# Patient Record
Sex: FEMALE | Race: WHITE | Employment: PART TIME | URBAN - METROPOLITAN AREA
[De-identification: names, ages, dates, MRNs, and addresses within clinical notes are randomized per-mention and may not be internally consistent; named-entity substitution may affect disease eponyms.]

---

## 2017-01-18 ENCOUNTER — HOSPITAL ENCOUNTER (OUTPATIENT)
Dept: MRI IMAGING | Age: 62
Discharge: HOME | End: 2017-01-18
Attending: SPECIALIST
Payer: COMMERCIAL

## 2017-01-18 VITALS
WEIGHT: 122 LBS | BODY MASS INDEX: 19.61 KG/M2 | OXYGEN SATURATION: 100 % | HEIGHT: 66 IN | SYSTOLIC BLOOD PRESSURE: 125 MMHG | DIASTOLIC BLOOD PRESSURE: 76 MMHG | HEART RATE: 69 BPM | RESPIRATION RATE: 14 BRPM

## 2017-01-18 DIAGNOSIS — I49.3 FREQUENT PVCS: ICD-10-CM

## 2017-01-18 PROCEDURE — 75561 CARDIAC MRI FOR MORPH W/DYE: CPT

## 2017-01-18 PROCEDURE — 99153 MOD SED SAME PHYS/QHP EA: CPT

## 2017-01-18 PROCEDURE — 75557 CARDIAC MRI FOR MORPH: CPT

## 2017-01-18 PROCEDURE — 99152 MOD SED SAME PHYS/QHP 5/>YRS: CPT

## 2017-01-18 PROCEDURE — A9579 GAD-BASE MR CONTRAST NOS,1ML: HCPCS | Performed by: SPECIALIST

## 2017-01-18 PROCEDURE — 74011250636 HC RX REV CODE- 250/636: Performed by: INTERNAL MEDICINE

## 2017-01-18 PROCEDURE — 77030021566

## 2017-01-18 PROCEDURE — 74011636320 HC RX REV CODE- 636/320: Performed by: SPECIALIST

## 2017-01-18 RX ORDER — MIDAZOLAM HYDROCHLORIDE 1 MG/ML
5 INJECTION, SOLUTION INTRAMUSCULAR; INTRAVENOUS
Status: DISCONTINUED | OUTPATIENT
Start: 2017-01-18 | End: 2017-01-18

## 2017-01-18 RX ORDER — FENTANYL CITRATE 50 UG/ML
100 INJECTION, SOLUTION INTRAMUSCULAR; INTRAVENOUS
Status: DISCONTINUED | OUTPATIENT
Start: 2017-01-18 | End: 2017-01-18

## 2017-01-18 RX ADMIN — MIDAZOLAM HYDROCHLORIDE 1 MG: 1 INJECTION, SOLUTION INTRAMUSCULAR; INTRAVENOUS at 09:01

## 2017-01-18 RX ADMIN — GADOPENTETATE DIMEGLUMINE 25 ML: 469.01 INJECTION INTRAVENOUS at 12:00

## 2017-01-18 RX ADMIN — MIDAZOLAM HYDROCHLORIDE 1 MG: 1 INJECTION, SOLUTION INTRAMUSCULAR; INTRAVENOUS at 09:04

## 2017-01-18 RX ADMIN — MIDAZOLAM HYDROCHLORIDE 1 MG: 1 INJECTION, SOLUTION INTRAMUSCULAR; INTRAVENOUS at 09:07

## 2017-01-18 NOTE — DISCHARGE INSTRUCTIONS
Sedation for a Medical Procedure: After Your Visit  Instructions. Sedatives are used to relax you for a procedure. You may or may not be awake during the procedure. Common side effects of sedation medications include:                   Drowsiness, dizziness, euphoria, sleepiness or confusion. I              Unsteady gait. Loss of fine muscle control and delayed reaction time. Visual                       disturbances, difficulty focusing and blurred vision. Impaired memory recall. Follow-up care is a key component for your health and safety. Be sure to make and go to all medical appointments. Call your doctor if you are having problems. It's also a good idea to keep a list of your allergies, medicines with doses and test results on hand    Home care following your sedation procedure: You may experience some of these side effects or you may not be aware of subtle changes in your behavior or reaction time. Because you received these medications, we are giving you the following instructions: Activity   For your safety, you should not drive until the medicine wears off and you can think clearly and react easily. Do not drive for 24 hours. Rest when you feel tired. Getting enough sleep will help you recover. Diet    You can eat your normal diet. If your stomach is upset, try bland, low-fat foods like plain rice, broiled chicken, toast, and yogurt. Drink plenty of fluids unless your doctor advised you to limit your fluids. Do not consume alcoholic beverages for 24 hours. Instructions  Do not make important personal, business, or legal decisions for 24 hours. Move slowly and carefully, do not make sudden position changes. Be alert for dizziness or lightheadedness and move accordingly. Have a responsible person assist you. Do not drive for 24 hours. Do not operate equipment for 24 hours. [employer], power tools, kitchen appliances, etc.)    Discharge medications:  Resume prior to test medications as prescribed by your personal physician. If you have any questions or concerns call Radiology RN at [phone]  After hours call Radiology on-call at [phone]    Call [phone] any time you think you may need emergency care. For example:                Call if you passed out (lost consciousness). The medicine is not wearing off and you cannot think clearly. Watch closely for changes in your health, and be sure to contact your doctor if                  you have any problems. Where can you learn more? Go to [email or web address]   Enter G817 in the search box to learn more about \"Sedation for a Medical Procedure: After Your Visit. \"

## 2017-01-18 NOTE — IP AVS SNAPSHOT
Mariana Portage 
 
 
 [address] 
[phone] Patient: Minor Alt MRN: [id number] KV You are allergic to the following Allergen Reactions Sulfa (Sulfonamide Antibiotics) Rash Recent Documentation Height Weight Breastfeeding? BMI Smoking Status 1.676 m 55.3 kg No 19.69 kg/m2 Never Smoker Emergency Contacts Name Discharge Info Relation Home Work Mobile [facility] DISCHARGE CAREGIVER [3] Spouse [3] [phone] About your hospitalization You were admitted on:  2017 You last received care in the:  [address] You were discharged on:  2017 Unit phone number:  [phone] Why you were hospitalized Your primary diagnosis was:  Not on File Providers Seen During Your Hospitalizations Provider Role Specialty Primary office phone Mackenzie Trujillo MD Attending Provider Cardiology [phone] Your Primary Care Physician (PCP) Primary Care Physician Office Phone Office Fax [facility] [phone] Follow-up Information None Your Appointments 2017 11:40 AM EST New Patient with Sruthi Xie MD  
[facility] ([facility]) [address] [address] [address] [facility]  
[phone]   3:20 PM EST  
ESTABLISHED PATIENT with Mackenzie Trujillo MD  
[facility] ([facility]) [address] [facility] [address] [place] [address]  
[phone] Current Discharge Medication List  
  
ASK your doctor about these medications Dose & Instructions Dispensing Information Comments Morning Noon Evening Bedtime Cholecalciferol (Vitamin D3) 2,000 unit Cap capsule Commonly known as:  VITAMIN D3  
   
 Your next dose is: Today, Tomorrow Other:  _________ Take  by mouth two (2) times a day. Refills:  0  
     
   
   
   
  
 COQ10 (UBIQUINOL) PO Your next dose is: Today, Tomorrow Other:  _________ Take  by mouth. Refills:  0 KRILL OIL PO Your next dose is: Today, Tomorrow Other:  _________ Take  by mouth. Refills:  0 MAGNESIUM CITRATE PO Your next dose is: Today, Tomorrow Other:  _________ Take  by mouth. Refills:  0  
     
   
   
   
  
 metoprolol tartrate 25 mg tablet Commonly known as:  LOPRESSOR Your next dose is: Today, Tomorrow Other:  _________ Dose:  12.5 mg Take 0.5 Tabs by mouth two (2) times a day. Quantity:  30 Tab Refills:  12 Discharge Instructions Sedation for a Medical Procedure: After Your Visit  Instructions. Sedatives are used to relax you for a procedure. You may or may not be awake during the procedure. Common side effects of sedation medications include:    
 
            Drowsiness, dizziness, euphoria, sleepiness or confusion. I 
            Unsteady gait. Loss of fine muscle control and delayed reaction time. Visual                       disturbances, difficulty focusing and blurred vision. Impaired memory recall. Follow-up care is a key component for your health and safety. Be sure to make and go to all medical appointments. Call your doctor if you are having problems. It's also a good idea to keep a list of your allergies, medicines with doses and test results on hand Home care following your sedation procedure: You may experience some of these side effects or you may not be aware of subtle changes in your behavior or reaction time.   Because you received these medications, we are giving you the following instructions: Activity For your safety, you should not drive until the medicine wears off and you can think clearly and react easily. Do not drive for 24 hours. Rest when you feel tired. Getting enough sleep will help you recover. Diet You can eat your normal diet. If your stomach is upset, try bland, low-fat foods like plain rice, broiled chicken, toast, and yogurt. Drink plenty of fluids unless your doctor advised you to limit your fluids. Do not consume alcoholic beverages for 24 hours. Instructions Do not make important personal, business, or legal decisions for 24 hours. Move slowly and carefully, do not make sudden position changes. Be alert for dizziness or lightheadedness and move accordingly. Have a responsible person assist you. Do not drive for 24 hours. Do not operate equipment for 24 hours. [employer] mowers, power tools, kitchen appliances, etc.) Discharge medications: 
Resume prior to test medications as prescribed by your personal physician. If you have any questions or concerns call Radiology RN at [phone] After hours call Radiology on-call at [phone] Call [phone] any time you think you may need emergency care. For example:  
             Call if you passed out (lost consciousness). The medicine is not wearing off and you cannot think clearly. Watch closely for changes in your health, and be sure to contact your doctor if                  you have any problems. Where can you learn more? Go to [employer].be Enter J749 in the search box to learn more about \"Sedation for a Medical Procedure: After Your Visit. \" Discharge Orders None Introducing [facility]! Dear Osiris Ramon: 
Thank you for requesting a [employer] account.   Our records indicate that you already have an active [employer] account. You can access your account anytime at [email or web address] Did you know that you can access your hospital and ER discharge instructions at any time in [employer]? You can also review all of your test results from your hospital stay or ER visit. Additional Information If you have questions, please visit the Frequently Asked Questions section of the [employer] website at [email or web address] Remember, [employer] is NOT to be used for urgent needs. For medical emergencies, dial 911. Now available from your iPhone and Android! General Information Please provide this summary of care documentation to your next provider. Patient Signature:  ____________________________________________________________ Date:  ____________________________________________________________  
  
[facility] Provider Signature:  ____________________________________________________________ Date:  ____________________________________________________________

## 2017-01-18 NOTE — PROGRESS NOTES
Pt received to [facility] ambulatory, changed to gown and assessed. LS clear, auscultated S1, S2 HS. 20g IV started in R forearm. VS taken, stable. Consent signed by patient as well as disc release form. Allergies reviewed, NPO verified. Airway assessed, Mallampati 2 with 3 fingers. Dr Kevon Camarena in to consent patient. 0845--Taken to MRI, and prepped on table. Timeout at 0900, sedation started at 0901. Pt tolerated well, total of 3 mg versed given. Pt returned to [facility] alert and in stable condition. Discharge directions given. Pt taken out to waiting area with .

## 2017-01-27 ENCOUNTER — OFFICE VISIT (OUTPATIENT)
Dept: CARDIOLOGY CLINIC | Age: 62
End: 2017-01-27

## 2017-01-27 VITALS
HEIGHT: 66 IN | SYSTOLIC BLOOD PRESSURE: 134 MMHG | HEART RATE: 64 BPM | WEIGHT: 127.6 LBS | BODY MASS INDEX: 20.51 KG/M2 | DIASTOLIC BLOOD PRESSURE: 80 MMHG | OXYGEN SATURATION: 98 % | RESPIRATION RATE: 16 BRPM

## 2017-01-27 DIAGNOSIS — I49.3 PVC'S (PREMATURE VENTRICULAR CONTRACTIONS): Primary | ICD-10-CM

## 2017-01-27 RX ORDER — LANOLIN ALCOHOL/MO/W.PET/CERES
400 CREAM (GRAM) TOPICAL DAILY
COMMUNITY

## 2017-01-27 NOTE — PROGRESS NOTES
Visit Vitals    /80 (BP 1 Location: Left arm, BP Patient Position: Sitting)    Pulse 64    Resp 16    Ht 5' 6\" (1.676 m)    Wt 127 lb 9.6 oz (57.9 kg)    SpO2 98%    BMI 20.6 kg/m2

## 2017-01-27 NOTE — PROGRESS NOTES
HISTORY OF PRESENTING ILLNESS      Armando Nielsen is a 57 y.o. female with asymptomatic PVCs referred for evaluation of cardiac risk. She reports family history of pre-mature sudden death. Her father passed away in her 62s and her brother in his late 46s. She notes few episodes of syncope due to high stress, otherwise she denies any recurrent syncope or near syncopal symptoms. Previous holter demonstrated 15% burden. The patient denies chest pain/ shortness of breath, orthopnea, PND, LE edema, palpitations, syncope, presyncope or fatigue. ACTIVE PROBLEM LIST     Patient Active Problem List    Diagnosis Date Noted    Frequent PVCs 12/08/2016           PAST MEDICAL HISTORY     Past Medical History   Diagnosis Date    FH ischemic heart disease      MI's in brother and father (they had sudden death)    Frequent PVCs      Asymptomatic PVC's noted on EKG 11/11/16    Scoliosis            PAST SURGICAL HISTORY     No past surgical history on file. ALLERGIES     Allergies   Allergen Reactions    Sulfa (Sulfonamide Antibiotics) Rash          FAMILY HISTORY     Family History   Problem Relation Age of Onset    Heart Attack Father     Heart Attack Brother     negative for cardiac disease       SOCIAL HISTORY     Social History     Social History    Marital status:      Spouse name: N/A    Number of children: N/A    Years of education: N/A     Social History Main Topics    Smoking status: Never Smoker    Smokeless tobacco: Not on file    Alcohol use No    Drug use: Not on file    Sexual activity: Not on file     Other Topics Concern    Not on file     Social History Narrative         MEDICATIONS     Current Outpatient Prescriptions   Medication Sig    metoprolol tartrate (LOPRESSOR) 25 mg tablet Take 0.5 Tabs by mouth two (2) times a day.  Cholecalciferol, Vitamin D3, (VITAMIN D3) 2,000 unit cap capsule Take  by mouth two (2) times a day.  COQ10, UBIQUINOL, PO Take  by mouth.     MAGNESIUM CITRATE PO Take  by mouth.  KRILL OIL PO Take  by mouth. No current facility-administered medications for this visit. I have reviewed the nurses notes, vitals, problem list, allergy list, medical history, family, social history and medications. REVIEW OF SYMPTOMS      General: Pt denies excessive weight gain or loss. Pt is able to conduct ADL's  HEENT: Denies blurred vision, headaches, hearing loss, epistaxis and difficulty swallowing. Respiratory: Denies cough, congestion, shortness of breath, RAMAN, wheezing or stridor. Cardiovascular: Denies precordial pain, edema or PND, Positive for skipped beats. Gastrointestinal: Denies poor appetite, indigestion, abdominal pain or blood in stool  Genitourinary: Denies hematuria, dysuria, increased urinary frequency  Musculoskeletal: Denies joint pain or swelling from muscles or joints  Neurologic: Denies tremor, paresthesias, headache, or sensory motor disturbance  Psychiatric: Denies confusion, insomnia, depression  Integumentray: Denies rash, itching or ulcers. Hematologic: Denies easy bruising, bleeding     PHYSICAL EXAMINATION      There were no vitals filed for this visit. General: Well developed, in no acute distress. HEENT: No jaundice, oral mucosa moist, no oral ulcers  Neck: Supple, no stiffness, no lymphadenopathy, supple  Heart:  Normal S1/S2 negative S3 or S4. Regular, no murmur, gallop or rub, no jugular venous distention  Respiratory: Clear bilaterally x 4, no wheezing or rales  Abdomen:   Soft, non-tender, bowel sounds are active.   Extremities:  No edema, normal cap refill, no cyanosis. Musculoskeletal: No clubbing, no deformities  Neuro: A&Ox3, speech clear, gait stable, cooperative, no focal neurologic deficits  Skin: Skin color is normal. No rashes or lesions.  Non diaphoretic, moist.  Vascular: 2+ pulses symmetric in all extremities       DIAGNOSTIC DATA      EKG: sinus with PVC's with right bundle branch, superior axis with positive precordial transition between V3 and V4     LABORATORY DATA    No results found for: WBC, HGBPOC, HGB, HGBP, HCTPOC, HCT, PHCT, RBCH, PLT, MCV, HGBEXT, HCTEXT, PLTEXT   No results found for: NA, K, CL, CO2, AGAP, GLU, BUN, CREA, BUCR, GFRAA, GFRNA, CA, TBIL, TBILI, GPT, SGOT, AP, TP, ALB, GLOB, AGRAT, ALT        ASSESSMENT      1. PVCs       PLAN      Previous holter demonstrated 15% burden. Will obtain a holter monitor in 2-3 weeks for further evaluation of PVC burden since initiation of Metoprolol. May consider Diltiazem in the future as she has reported some fatigue with the initiation of Metoprolol. FOLLOW-UP     Follow up in 2-3 weeks. Thank you,  Regina Hernandez MD and Dr. Hilda Rainey for involving me in the care of this extraordinarily pleasant female. Please do not hesitate to contact me for further questions/concerns. Written by yogesh Roy, as dictated by Dr. Lexus Casiano.      Lexus Casiano MD  Cardiac Electrophysiology / Cardiology    [address] [address].  [address], [address] [facility], [address]  [place], [address] [place] [address], [facility]  [phone] / [phone] Fax   [phone] / [phone] Fax

## 2017-02-16 ENCOUNTER — CLINICAL SUPPORT (OUTPATIENT)
Dept: CARDIOLOGY CLINIC | Age: 62
End: 2017-02-16

## 2017-02-16 DIAGNOSIS — I49.3 PVC (PREMATURE VENTRICULAR CONTRACTION): Primary | ICD-10-CM

## 2017-02-16 NOTE — PROGRESS NOTES
Applied holter monitor for pt per Dr Coleman nGame  Dx: PVC. Pt has #[id number] & is due back on Fri 2/17/17.

## 2017-03-03 ENCOUNTER — OFFICE VISIT (OUTPATIENT)
Dept: CARDIOLOGY CLINIC | Age: 62
End: 2017-03-03

## 2017-03-03 VITALS
HEART RATE: 64 BPM | RESPIRATION RATE: 16 BRPM | OXYGEN SATURATION: 100 % | BODY MASS INDEX: 20.34 KG/M2 | DIASTOLIC BLOOD PRESSURE: 84 MMHG | WEIGHT: 126.6 LBS | SYSTOLIC BLOOD PRESSURE: 122 MMHG | HEIGHT: 66 IN

## 2017-03-03 DIAGNOSIS — I49.3 PVC (PREMATURE VENTRICULAR CONTRACTION): Primary | ICD-10-CM

## 2017-03-03 RX ORDER — DILTIAZEM HYDROCHLORIDE 120 MG/1
120 CAPSULE, COATED, EXTENDED RELEASE ORAL DAILY
Qty: 30 CAP | Refills: 3 | Status: SHIPPED | OUTPATIENT
Start: 2017-03-03 | End: 2017-04-26

## 2017-03-03 NOTE — PROGRESS NOTES
Visit Vitals    /84 (BP 1 Location: Left arm, BP Patient Position: Sitting)    Pulse 64    Resp 16    Ht 5' 6\" (1.676 m)    Wt 126 lb 9.6 oz (57.4 kg)    SpO2 100%    BMI 20.43 kg/m2

## 2017-03-03 NOTE — PROGRESS NOTES
HISTORY OF PRESENTING ILLNESS      Laura Kohli is a 57 y.o. female with with asymptomatic PVCs referred for evaluation of cardiac risk. She reports family history of pre-mature sudden death. Her father passed away in her 62s and her brother in his late 46s. She notes few episodes of syncope due to high stress, otherwise she denies any recurrent syncope or near syncopal symptoms. Previous holter demonstrated 15% burden. Last visit we obtained an additional holter monitor to evaluate PVC burden since initiation of Metoprolol. Her PVC burden has decreased to .23% with some rare atrial ectopy. SHe still feels \"weird\" and fatigued with the metoprolol. This is her high stress season as an  and her brother was recently diagnosed with lung cancer. The patient denies chest pain/ shortness of breath, orthopnea, PND, LE edema, palpitations, syncope, presyncope or fatigue. ACTIVE PROBLEM LIST     Patient Active Problem List    Diagnosis Date Noted    Frequent PVCs 12/08/2016           PAST MEDICAL HISTORY     Past Medical History:   Diagnosis Date    FH ischemic heart disease     MI's in brother and father (they had sudden death)    Frequent PVCs     Asymptomatic PVC's noted on EKG 11/11/16    Scoliosis            PAST SURGICAL HISTORY     No past surgical history on file.        ALLERGIES     Allergies   Allergen Reactions    Sulfa (Sulfonamide Antibiotics) Rash          FAMILY HISTORY     Family History   Problem Relation Age of Onset    Heart Attack Father     Heart Attack Brother     negative for cardiac disease       SOCIAL HISTORY     Social History     Social History    Marital status:      Spouse name: N/A    Number of children: N/A    Years of education: N/A     Social History Main Topics    Smoking status: Never Smoker    Smokeless tobacco: Not on file    Alcohol use No    Drug use: Not on file    Sexual activity: Not on file     Other Topics Concern    Not on file Social History Narrative         MEDICATIONS     Current Outpatient Prescriptions   Medication Sig    magnesium oxide (MAG-OX) 400 mg tablet Take 400 mg by mouth daily.  metoprolol tartrate (LOPRESSOR) 25 mg tablet Take 0.5 Tabs by mouth two (2) times a day. (Patient taking differently: Take 6.25 mg by mouth two (2) times a day.)    Cholecalciferol, Vitamin D3, (VITAMIN D3) 2,000 unit cap capsule Take 2,000 Units by mouth daily.  COQ10, UBIQUINOL, PO Take  by mouth.  KRILL OIL PO Take  by mouth daily. No current facility-administered medications for this visit. I have reviewed the nurses notes, vitals, problem list, allergy list, medical history, family, social history and medications. REVIEW OF SYMPTOMS      General: Pt denies excessive weight gain or loss. Pt is able to conduct ADL's  HEENT: Denies blurred vision, headaches, hearing loss, epistaxis and difficulty swallowing. Respiratory: Denies cough, congestion, shortness of breath, RAMAN, wheezing or stridor. Cardiovascular: Denies precordial pain, palpitations, edema or PND  Gastrointestinal: Denies poor appetite, indigestion, abdominal pain or blood in stool  Genitourinary: Denies hematuria, dysuria, increased urinary frequency  Musculoskeletal: Denies joint pain or swelling from muscles or joints  Neurologic: Denies tremor, paresthesias, headache, or sensory motor disturbance  Psychiatric: Denies confusion, insomnia, depression  Integumentray: Denies rash, itching or ulcers. Hematologic: Denies easy bruising, bleeding       PHYSICAL EXAMINATION      There were no vitals filed for this visit. General: Well developed, in no acute distress. HEENT: No jaundice, oral mucosa moist, no oral ulcers  Neck: Supple, no stiffness, no lymphadenopathy, supple  Heart:  Normal S1/S2 negative S3 or S4.  Regular, no murmur, gallop or rub, no jugular venous distention  Respiratory: Clear bilaterally x 4, no wheezing or rales  Abdomen:   Soft, non-tender, bowel sounds are active.   Extremities:  No edema, normal cap refill, no cyanosis. Musculoskeletal: No clubbing, no deformities  Neuro: A&Ox3, speech clear, gait stable, cooperative, no focal neurologic deficits  Skin: Skin color is normal. No rashes or lesions. Non diaphoretic, moist.  Vascular: 2+ pulses symmetric in all extremities       DIAGNOSTIC DATA      EKG:        LABORATORY DATA    No results found for: WBC, HGBPOC, HGB, HGBP, HCTPOC, HCT, PHCT, RBCH, PLT, MCV, HGBEXT, HCTEXT, PLTEXT   No results found for: NA, K, CL, CO2, AGAP, GLU, BUN, CREA, BUCR, GFRAA, GFRNA, CA, TBIL, TBILI, GPT, SGOT, AP, TP, ALB, GLOB, AGRAT, ALT        ASSESSMENT      1. PVCs     PLAN     She has had adequate decrease of PVC burden (from 15% to .23% of all beats) with initiation of Metoprolol; however, she continued to have fatigue and has been taking 6.25mg BID. Will stop Metoprolol and start Diltiazem 120mg daily and follow up in 6 weeks with EKG and to assess improvement of fatigue. FOLLOW-UP     Follow up 6 weeks. Thank you, Ayan Yoo MD and Dr. Gary Cole for allowing me to participate in the care of this extraordinarily pleasant female. Please do not hesitate to contact me for further questions/concerns.      AMANDA Menon MD  Cardiac Electrophysiology / Cardiology    [facility].  [address] [place] [address], [facility], [address], [facility]  [phone] / [phone] Fax   [phone] / [phone] Fax

## 2017-03-03 NOTE — PATIENT INSTRUCTIONS
· Start diltiazem 120mg daily. · Follow up with our office in 6 weeks. · Contact our office with any concerns.

## 2017-04-26 ENCOUNTER — OFFICE VISIT (OUTPATIENT)
Dept: CARDIOLOGY CLINIC | Age: 62
End: 2017-04-26

## 2017-04-26 VITALS
OXYGEN SATURATION: 99 % | SYSTOLIC BLOOD PRESSURE: 114 MMHG | RESPIRATION RATE: 16 BRPM | HEIGHT: 66 IN | BODY MASS INDEX: 20.12 KG/M2 | HEART RATE: 68 BPM | DIASTOLIC BLOOD PRESSURE: 70 MMHG | WEIGHT: 125.2 LBS

## 2017-04-26 DIAGNOSIS — I49.3 FREQUENT PVCS: Primary | ICD-10-CM

## 2017-04-26 RX ORDER — METOPROLOL TARTRATE 25 MG/1
6.25 TABLET, FILM COATED ORAL 2 TIMES DAILY
COMMUNITY
Start: 2017-03-26

## 2017-04-26 NOTE — PROGRESS NOTES
HISTORY OF PRESENTING ILLNESS      Yenifer Wright is a 57 y.o. female with asymptomatic PVCs referred for evaluation of cardiac risk. She reports family history of pre-mature sudden death. Her father passed away in her 62s and her brother in his late 46s. She notes few episodes of syncope due to high stress, otherwise she denies any recurrent syncope or near syncopal symptoms. Previous holter demonstrated 15% burden. Last visit we obtained an additional holter monitor to evaluate PVC burden since initiation of Metoprolol. Her PVC burden has decreased to .23% with some rare atrial ectopy. SHe still feels \"weird\" and fatigued with the metoprolol. This is her high stress season as an  and her brother was recently diagnosed with lung cancer. Last visit she reported fatigue with Metoprolol. It was discontinued and Diltiazem was initiated, however she opted to continue Metoprolol at 6.25mg BID and she presents now for follow up. She denies further fatigue with metoprolol. The patient denies chest pain/ shortness of breath, orthopnea, PND, LE edema, palpitations, syncope, presyncope or fatigue. ACTIVE PROBLEM LIST     Patient Active Problem List    Diagnosis Date Noted    Frequent PVCs 12/08/2016           PAST MEDICAL HISTORY     Past Medical History:   Diagnosis Date    FH ischemic heart disease     MI's in brother and father (they had sudden death)    Frequent PVCs     Asymptomatic PVC's noted on EKG 11/11/16    Scoliosis            PAST SURGICAL HISTORY     No past surgical history on file.        ALLERGIES     Allergies   Allergen Reactions    Sulfa (Sulfonamide Antibiotics) Rash          FAMILY HISTORY     Family History   Problem Relation Age of Onset    Heart Attack Father     Heart Attack Brother     negative for cardiac disease       SOCIAL HISTORY     Social History     Social History    Marital status:      Spouse name: N/A    Number of children: N/A    Years of education: N/A     Social History Main Topics    Smoking status: Never Smoker    Smokeless tobacco: Not on file    Alcohol use No    Drug use: Not on file    Sexual activity: Not on file     Other Topics Concern    Not on file     Social History Narrative         MEDICATIONS     Current Outpatient Prescriptions   Medication Sig    dilTIAZem CD (CARDIZEM CD) 120 mg ER capsule Take 1 Cap by mouth daily.  magnesium oxide (MAG-OX) 400 mg tablet Take 400 mg by mouth daily.  Cholecalciferol, Vitamin D3, (VITAMIN D3) 2,000 unit cap capsule Take 2,000 Units by mouth daily.  COQ10, UBIQUINOL, PO Take  by mouth.  KRILL OIL PO Take  by mouth daily. No current facility-administered medications for this visit. I have reviewed the nurses notes, vitals, problem list, allergy list, medical history, family, social history and medications. REVIEW OF SYMPTOMS      General: Pt denies excessive weight gain or loss. Pt is able to conduct ADL's  HEENT: Denies blurred vision, headaches, hearing loss, epistaxis and difficulty swallowing. Respiratory: Denies cough, congestion, shortness of breath, RAMAN, wheezing or stridor. Cardiovascular: +palpitations, Denies precordial pain,  edema or PND  Gastrointestinal: Denies poor appetite, indigestion, abdominal pain or blood in stool  Genitourinary: Denies hematuria, dysuria, increased urinary frequency  Musculoskeletal: Denies joint pain or swelling from muscles or joints  Neurologic: Denies tremor, paresthesias, headache, or sensory motor disturbance  Psychiatric: Denies confusion, insomnia, depression  Integumentray: Denies rash, itching or ulcers. Hematologic: Denies easy bruising, bleeding       PHYSICAL EXAMINATION      There were no vitals filed for this visit. General: Well developed, in no acute distress.   HEENT: No jaundice, oral mucosa moist, no oral ulcers  Neck: Supple, no stiffness, no lymphadenopathy, supple  Heart:  Normal S1/S2 negative S3 or S4. Regular, no murmur, gallop or rub, no jugular venous distention  Respiratory: Clear bilaterally x 4, no wheezing or rales  Abdomen:   Soft, non-tender, bowel sounds are active.   Extremities:  No edema, normal cap refill, no cyanosis. Musculoskeletal: No clubbing, no deformities  Neuro: A&Ox3, speech clear, gait stable, cooperative, no focal neurologic deficits  Skin: Skin color is normal. No rashes or lesions. Non diaphoretic, moist.  Vascular: 2+ pulses symmetric in all extremities       DIAGNOSTIC DATA      EKG: sinus rhythm        LABORATORY DATA    No results found for: WBC, HGBPOC, HGB, HGBP, HCTPOC, HCT, PHCT, RBCH, PLT, MCV, HGBEXT, HCTEXT, PLTEXT   No results found for: NA, K, CL, CO2, AGAP, GLU, BUN, CREA, BUCR, GFRAA, GFRNA, CA, TBIL, TBILI, GPT, SGOT, AP, TP, ALB, GLOB, AGRAT, ALT        ASSESSMENT      1. Premature ventricular contractions       PLAN     She will continue with current dose of metoprolol as she is pleased with current level of control of palpitations. Will consider escalation of drug therapy for increase in symptoms. FOLLOW-UP   6 months. Thank you, Samm Singh MD  And Dr. Rosa York for allowing me to participate in the care of this extraordinarily pleasant female. Please do not hesitate to contact me for further questions/concerns.      AMANDA Sheth MD  Cardiac Electrophysiology / Cardiology    [facility] [address], [address], [address]  [facility]    [address] [facility]  [phone] / [phone] Fax   [phone] / [phone] Fax

## 2017-04-26 NOTE — PROGRESS NOTES
Visit Vitals    /70 (BP 1 Location: Left arm, BP Patient Position: Sitting)    Pulse 68    Resp 16    Ht 5' 6\" (1.676 m)    Wt 125 lb 3.2 oz (56.8 kg)    SpO2 99%    BMI 20.21 kg/m2

## 2017-11-03 ENCOUNTER — OFFICE VISIT (OUTPATIENT)
Dept: CARDIOLOGY CLINIC | Age: 62
End: 2017-11-03

## 2017-11-03 VITALS
BODY MASS INDEX: 19.86 KG/M2 | OXYGEN SATURATION: 97 % | WEIGHT: 123.6 LBS | HEIGHT: 66 IN | DIASTOLIC BLOOD PRESSURE: 70 MMHG | HEART RATE: 72 BPM | RESPIRATION RATE: 16 BRPM | SYSTOLIC BLOOD PRESSURE: 134 MMHG

## 2017-11-03 DIAGNOSIS — I49.3 FREQUENT PVCS: Primary | ICD-10-CM

## 2017-11-03 NOTE — PROGRESS NOTES
HISTORY OF PRESENTING ILLNESS      Abena Torres is a 57 y.o. female with PVCs and palpitations whose previous Holter demonstrated 15% burden. Addition of metoprolol resulted in reduction of her PVC burden to .23% with some rare atrial ectopy. She still feels \"weird\" and fatigued with the metoprolol. Last visit she reported fatigue with Metoprolol. It was discontinued and Diltiazem was initiated, however she opted to continue Metoprolol at 6.25mg BID and she presents now for follow up. Since her last visit she self discontinued her metoprolol and denies recurrence of palpitations. ACTIVE PROBLEM LIST     Patient Active Problem List    Diagnosis Date Noted    Frequent PVCs 12/08/2016           PAST MEDICAL HISTORY     Past Medical History:   Diagnosis Date    FH ischemic heart disease     MI's in brother and father (they had sudden death)    Frequent PVCs     Asymptomatic PVC's noted on EKG 11/11/16    Scoliosis            PAST SURGICAL HISTORY     History reviewed. No pertinent surgical history. ALLERGIES     Allergies   Allergen Reactions    Sulfa (Sulfonamide Antibiotics) Rash          FAMILY HISTORY     Family History   Problem Relation Age of Onset    Heart Attack Father     Heart Attack Brother     negative for cardiac disease       SOCIAL HISTORY     Social History     Social History    Marital status:      Spouse name: N/A    Number of children: N/A    Years of education: N/A     Social History Main Topics    Smoking status: Never Smoker    Smokeless tobacco: Never Used    Alcohol use No    Drug use: None    Sexual activity: Not Asked     Other Topics Concern    None     Social History Narrative         MEDICATIONS     Current Outpatient Prescriptions   Medication Sig    magnesium oxide (MAG-OX) 400 mg tablet Take 400 mg by mouth daily.  Cholecalciferol, Vitamin D3, (VITAMIN D3) 2,000 unit cap capsule Take 2,000 Units by mouth daily.     COQ10, UBIQUINOL, PO Take  by mouth.  KRILL OIL PO Take  by mouth daily.  metoprolol tartrate (LOPRESSOR) 25 mg tablet Take 6.25 mg by mouth two (2) times a day. Takes 1/2 tablet twice daily if needed     No current facility-administered medications for this visit. I have reviewed the nurses notes, vitals, problem list, allergy list, medical history, family, social history and medications. REVIEW OF SYMPTOMS      General: Pt denies excessive weight gain or loss. Pt is able to conduct ADL's  HEENT: Denies blurred vision, headaches, hearing loss, epistaxis and difficulty swallowing. Respiratory: Denies cough, congestion, shortness of breath, RAMAN, wheezing or stridor. Cardiovascular: Denies precordial pain, palpitations, edema or PND  Gastrointestinal: Denies poor appetite, indigestion, abdominal pain or blood in stool  Genitourinary: Denies hematuria, dysuria, increased urinary frequency  Musculoskeletal: Denies joint pain or swelling from muscles or joints  Neurologic: Denies tremor, paresthesias, headache, or sensory motor disturbance  Psychiatric: Denies confusion, insomnia, depression  Integumentray: Denies rash, itching or ulcers. Hematologic: Denies easy bruising, bleeding     PHYSICAL EXAMINATION      Vitals:    11/03/17 1038   BP: 134/70   Pulse: 72   Resp: 16   SpO2: 97%   Weight: 123 lb 9.6 oz (56.1 kg)   Height: 5' 6\" (1.676 m)     General: Well developed, in no acute distress. HEENT: No jaundice, oral mucosa moist, no oral ulcers  Neck: Supple, no stiffness, no lymphadenopathy, supple  Heart:  Normal S1/S2 negative S3 or S4. Regular, no murmur, gallop or rub, no jugular venous distention  Respiratory: Clear bilaterally x 4, no wheezing or rales  Abdomen:   Soft, non-tender, bowel sounds are active.   Extremities:  No edema, normal cap refill, no cyanosis.   Musculoskeletal: No clubbing, no deformities  Neuro: A&Ox3, speech clear, gait stable, cooperative, no focal neurologic deficits  Skin: Skin color is normal. No rashes or lesions. Non diaphoretic, moist.  Vascular: 2+ pulses symmetric in all extremities       DIAGNOSTIC DATA      EKG: Sinus rhythm       LABORATORY DATA    No results found for: WBC, HGBPOC, HGB, HGBP, HCTPOC, HCT, PHCT, RBCH, PLT, MCV, HGBEXT, HCTEXT, PLTEXT   No results found for: NA, K, CL, CO2, AGAP, GLU, BUN, CREA, BUCR, GFRAA, GFRNA, CA, TBIL, TBILI, GPT, SGOT, AP, TP, ALB, GLOB, AGRAT, ALT        ASSESSMENT      1. Premature ventricular contractions  2. Palpitations       PLAN     Patient is doing well currently with rare palpitations and wishes to remain off of metoprolol. I recommended a repeat Holter monitor however she wished to avoid this for now and preferred to monitor clinically for palpitation recurrence. She will take metoprolol 6.25 mg twice daily as needed as a pill in the pocket for exacerbations of PVCs/palpitations. Advised her to consider a daily multivitamin as well as to maintain daily hydration. FOLLOW-UP     1 year       Thank you,  Linh Carvajal MD and Dr. Miguel Bolivar for involving me in the care of this extraordinarily pleasant female. Please do not hesitate to contact me for further questions/concerns.          Izaiah Machuca MD  Cardiac Electrophysiology / Cardiology    [place] [id number].  [address] [place] [address], [address]  [phone] / [phone] Fax   [phone] / [phone] Fax

## 2017-11-03 NOTE — PROGRESS NOTES
Visit Vitals    /70 (BP 1 Location: Left arm, BP Patient Position: Sitting)    Pulse 72    Resp 16    Ht 5' 6\" (1.676 m)    Wt 123 lb 9.6 oz (56.1 kg)    SpO2 97%    BMI 19.95 kg/m2

## 2019-01-09 ENCOUNTER — HOSPITAL ENCOUNTER (EMERGENCY)
Age: 64
Discharge: HOME | End: 2019-01-09
Attending: EMERGENCY MEDICINE
Payer: COMMERCIAL

## 2019-01-09 VITALS
RESPIRATION RATE: 14 BRPM | WEIGHT: 125 LBS | BODY MASS INDEX: 20.09 KG/M2 | HEART RATE: 63 BPM | HEIGHT: 66 IN | DIASTOLIC BLOOD PRESSURE: 89 MMHG | OXYGEN SATURATION: 99 % | TEMPERATURE: 97.7 F | SYSTOLIC BLOOD PRESSURE: 160 MMHG

## 2019-01-09 DIAGNOSIS — S16.1XXA STRAIN OF NECK MUSCLE, INITIAL ENCOUNTER: ICD-10-CM

## 2019-01-09 DIAGNOSIS — S09.90XA INJURY OF HEAD, INITIAL ENCOUNTER: Primary | ICD-10-CM
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
## 2019-01-09 PROCEDURE — 99281 EMR DPT VST MAYX REQ PHY/QHP: CPT

## 2019-01-09 RX ORDER — NAPROXEN 500 MG/1
500 TABLET ORAL 2 TIMES DAILY WITH MEALS
Qty: 20 TAB | Refills: 0 | Status: SHIPPED | OUTPATIENT
Start: 2019-01-09 | End: 2019-01-19

## 2019-01-09 RX ORDER — HYDROCODONE BITARTRATE AND ACETAMINOPHEN 5; 325 MG/1; MG/1
1 TABLET ORAL
Qty: 10 TAB | Refills: 0 | Status: SHIPPED | OUTPATIENT
Start: 2019-01-09

## 2019-01-09 RX ORDER — METHOCARBAMOL 750 MG/1
750 TABLET, FILM COATED ORAL 3 TIMES DAILY
Qty: 20 TAB | Refills: 0 | Status: SHIPPED | OUTPATIENT
Start: 2019-01-09
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
## 2019-01-09 NOTE — ED NOTES
Patient discharged with prescriptions by Dr. Jessenia Garcia prior to RN assessment. Instructions reviewed and patient verbalizes understanding. Ambulatory with NAD.

## 2021-06-19 ENCOUNTER — HOSPITAL ENCOUNTER (EMERGENCY)
Age: 66
Discharge: HOME | End: 2021-06-19
Attending: EMERGENCY MEDICINE
Payer: MEDICARE

## 2021-06-19 VITALS
RESPIRATION RATE: 26 BRPM | OXYGEN SATURATION: 100 % | WEIGHT: 122 LBS | SYSTOLIC BLOOD PRESSURE: 137 MMHG | HEART RATE: 77 BPM | BODY MASS INDEX: 20.33 KG/M2 | DIASTOLIC BLOOD PRESSURE: 71 MMHG | HEIGHT: 65 IN | TEMPERATURE: 97.7 F

## 2021-06-19 DIAGNOSIS — T63.001A: Primary | ICD-10-CM

## 2021-06-19 PROCEDURE — 99285 EMERGENCY DEPT VISIT HI MDM: CPT

## 2021-06-19 PROCEDURE — 74011250637 HC RX REV CODE- 250/637: Performed by: EMERGENCY MEDICINE

## 2021-06-19 RX ORDER — ACETAMINOPHEN 500 MG
1000 TABLET ORAL
Qty: 20 TABLET | Refills: 0 | Status: SHIPPED | OUTPATIENT
Start: 2021-06-19

## 2021-06-19 RX ORDER — IBUPROFEN 800 MG/1
800 TABLET ORAL
Qty: 20 TABLET | Refills: 0 | Status: SHIPPED | OUTPATIENT
Start: 2021-06-19 | End: 2021-06-26

## 2021-06-19 RX ORDER — OXYCODONE HYDROCHLORIDE 5 MG/1
5 TABLET ORAL
Qty: 12 TABLET | Refills: 0 | Status: SHIPPED | OUTPATIENT
Start: 2021-06-19 | End: 2021-06-22

## 2021-06-19 RX ORDER — ACETAMINOPHEN 500 MG
1000 TABLET ORAL
Status: COMPLETED | OUTPATIENT
Start: 2021-06-19 | End: 2021-06-19

## 2021-06-19 RX ORDER — IBUPROFEN 800 MG/1
800 TABLET ORAL
Status: COMPLETED | OUTPATIENT
Start: 2021-06-19 | End: 2021-06-19

## 2021-06-19 RX ADMIN — IBUPROFEN 800 MG: 800 TABLET, FILM COATED ORAL at 11:35

## 2021-06-19 RX ADMIN — ACETAMINOPHEN 1000 MG: 500 TABLET, FILM COATED ORAL at 11:35
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
## 2021-06-20 NOTE — ED PROVIDER NOTES
The history is provided by the patient. Snake Bite   This is a new problem. The current episode started 1 to 2 hours ago. The problem occurs constantly. The problem has been gradually worsening. Pain location: left ankle. The quality of the pain is described as aching. The pain is mild. Associated symptoms comments: swelling. The symptoms are aggravated by palpation. She has tried nothing for the symptoms. Past Medical History:   Diagnosis Date    FH ischemic heart disease     MI's in brother and father (they had sudden death)    Frequent PVCs     Asymptomatic PVC's noted on EKG 11/11/16    Scoliosis        No past surgical history on file. Family History:   Problem Relation Age of Onset    Heart Attack Father     Heart Attack Brother        Social History     Socioeconomic History    Marital status:      Spouse name: Not on file    Number of children: Not on file    Years of education: Not on file    Highest education level: Not on file   Occupational History    Not on file   Tobacco Use    Smoking status: Never Smoker    Smokeless tobacco: Never Used   Substance and Sexual Activity    Alcohol use: No    Drug use: Not on file    Sexual activity: Not on file   Other Topics Concern    Not on file   Social History Narrative    Not on file     Social Determinants of Health     Financial Resource Strain:     Difficulty of Paying Living Expenses:    Food Insecurity:     Worried About Running Out of Food in the Last Year:     Ran Out of Food in the Last Year:    Transportation Needs:     Lack of Transportation (Medical):      Lack of Transportation (Non-Medical):    Physical Activity:     Days of Exercise per Week:     Minutes of Exercise per Session:    Stress:     Feeling of Stress :    Social Connections:     Frequency of Communication with Friends and Family:     Frequency of Social Gatherings with Friends and Family:     Attends Caodaism Services:     Active Member of Clubs or Organizations:     Attends Club or Organization Meetings:     Marital Status:    Intimate Partner Violence:     Fear of Current or Ex-Partner:     Emotionally Abused:     Physically Abused:     Sexually Abused: ALLERGIES: Sulfa (sulfonamide antibiotics)    Review of Systems   All other systems reviewed and are negative. Vitals:    06/19/21 1245 06/19/21 1300 06/19/21 1315 06/19/21 1330   BP: 134/72 (!) 143/75 134/67 137/71   Pulse: 74 77 72 77   Resp: (!) 33 22 (!) 34 26   Temp:       SpO2: 100% 100% 99% 100%   Weight:       Height:                Physical Exam  Vitals and nursing note reviewed. Constitutional:       General: She is not in acute distress. Appearance: She is well-developed. HENT:      Head: Normocephalic and atraumatic. Eyes:      Conjunctiva/sclera: Conjunctivae normal.   Cardiovascular:      Rate and Rhythm: Normal rate and regular rhythm. Pulmonary:      Effort: Pulmonary effort is normal. No respiratory distress. Abdominal:      General: There is no distension. Musculoskeletal:         General: No deformity. Normal range of motion. Cervical back: Neck supple. Skin:     General: Skin is warm and dry. Comments: Small puncture wound to left posterior heel with 1+ pitting edema of ankle inferior to medial malleolus   Neurological:      Mental Status: She is alert. Cranial Nerves: No cranial nerve deficit. Psychiatric:         Behavior: Behavior normal.              MDM     76 y.o. female presents with snake bite from [facility]. Minimal localized swelling, no systemic symptoms. Elevated and iced with analgesia here and no rapid progression noted after a few hours of observation. No indication for crofab currently. Provided instructions for supportive care measures and provided analgesia for home as needed. Plan to follow up with PCP as needed and return precautions discussed for worsening or new concerning symptoms.       Procedures

## 2023-05-19 RX ORDER — ACETAMINOPHEN 500 MG
1000 TABLET ORAL EVERY 6 HOURS PRN
COMMUNITY
Start: 2021-06-19

## 2023-05-19 RX ORDER — METHOCARBAMOL 750 MG/1
750 TABLET, FILM COATED ORAL 3 TIMES DAILY
COMMUNITY
Start: 2019-01-09

## 2023-05-19 RX ORDER — MAGNESIUM OXIDE 400 MG/1
400 TABLET ORAL DAILY
COMMUNITY

## 2023-05-19 RX ORDER — HYDROCODONE BITARTRATE AND ACETAMINOPHEN 5; 325 MG/1; MG/1
1 TABLET ORAL
COMMUNITY
Start: 2019-01-09

## 2024-09-23 ENCOUNTER — OFFICE VISIT (OUTPATIENT)
Age: 69
End: 2024-09-23
Payer: MEDICARE

## 2024-09-23 VITALS
DIASTOLIC BLOOD PRESSURE: 76 MMHG | HEART RATE: 76 BPM | HEIGHT: 65 IN | WEIGHT: 123 LBS | SYSTOLIC BLOOD PRESSURE: 116 MMHG | BODY MASS INDEX: 20.49 KG/M2 | OXYGEN SATURATION: 99 %

## 2024-09-23 DIAGNOSIS — I49.3 FREQUENT PVCS: Primary | ICD-10-CM

## 2024-09-23 DIAGNOSIS — R06.02 SHORTNESS OF BREATH: ICD-10-CM

## 2024-09-23 PROCEDURE — 1123F ACP DISCUSS/DSCN MKR DOCD: CPT | Performed by: SPECIALIST

## 2024-09-23 PROCEDURE — 1090F PRES/ABSN URINE INCON ASSESS: CPT | Performed by: SPECIALIST

## 2024-09-23 PROCEDURE — G8427 DOCREV CUR MEDS BY ELIG CLIN: HCPCS | Performed by: SPECIALIST

## 2024-09-23 PROCEDURE — 93010 ELECTROCARDIOGRAM REPORT: CPT | Performed by: SPECIALIST

## 2024-09-23 PROCEDURE — G8420 CALC BMI NORM PARAMETERS: HCPCS | Performed by: SPECIALIST

## 2024-09-23 PROCEDURE — 99204 OFFICE O/P NEW MOD 45 MIN: CPT | Performed by: SPECIALIST

## 2024-09-23 PROCEDURE — 3017F COLORECTAL CA SCREEN DOC REV: CPT | Performed by: SPECIALIST

## 2024-09-23 PROCEDURE — 93005 ELECTROCARDIOGRAM TRACING: CPT | Performed by: SPECIALIST

## 2024-09-23 PROCEDURE — 1036F TOBACCO NON-USER: CPT | Performed by: SPECIALIST

## 2024-09-23 PROCEDURE — G8400 PT W/DXA NO RESULTS DOC: HCPCS | Performed by: SPECIALIST

## 2024-09-27 ENCOUNTER — ANCILLARY PROCEDURE (OUTPATIENT)
Age: 69
End: 2024-09-27
Payer: MEDICARE

## 2024-09-27 VITALS
WEIGHT: 123 LBS | SYSTOLIC BLOOD PRESSURE: 120 MMHG | HEART RATE: 82 BPM | BODY MASS INDEX: 20.49 KG/M2 | HEIGHT: 65 IN | DIASTOLIC BLOOD PRESSURE: 76 MMHG

## 2024-09-27 DIAGNOSIS — R06.02 SHORTNESS OF BREATH: ICD-10-CM

## 2024-09-27 PROCEDURE — 93017 CV STRESS TEST TRACING ONLY: CPT | Performed by: SPECIALIST

## 2024-09-29 LAB
ECHO BSA: 1.6 M2
STRESS ANGINA INDEX: 0
STRESS BASELINE DIAS BP: 76 MMHG
STRESS BASELINE HR: 82 BPM
STRESS BASELINE SYS BP: 120 MMHG
STRESS ESTIMATED WORKLOAD: 13.4 METS
STRESS EXERCISE DUR MIN: 10 MIN
STRESS EXERCISE DUR SEC: 0 SEC
STRESS O2 SAT PEAK: 93 %
STRESS O2 SAT REST: 94 %
STRESS PEAK DIAS BP: 86 MMHG
STRESS PEAK SYS BP: 142 MMHG
STRESS PERCENT HR ACHIEVED: 99 %
STRESS POST PEAK HR: 150 BPM
STRESS RATE PRESSURE PRODUCT: NORMAL BPM*MMHG
STRESS TARGET HR: 151 BPM

## 2024-09-29 PROCEDURE — 93016 CV STRESS TEST SUPVJ ONLY: CPT | Performed by: SPECIALIST

## 2024-09-29 PROCEDURE — 93018 CV STRESS TEST I&R ONLY: CPT | Performed by: SPECIALIST

## 2024-11-20 ENCOUNTER — ANCILLARY PROCEDURE (OUTPATIENT)
Age: 69
End: 2024-11-20
Payer: MEDICARE

## 2024-11-20 VITALS
HEIGHT: 65 IN | SYSTOLIC BLOOD PRESSURE: 118 MMHG | BODY MASS INDEX: 20.49 KG/M2 | DIASTOLIC BLOOD PRESSURE: 72 MMHG | WEIGHT: 123 LBS | HEART RATE: 78 BPM

## 2024-11-20 DIAGNOSIS — R06.02 SHORTNESS OF BREATH: ICD-10-CM

## 2024-11-20 PROCEDURE — 93306 TTE W/DOPPLER COMPLETE: CPT | Performed by: SPECIALIST

## 2024-11-21 LAB
ECHO AO ROOT DIAM: 3.2 CM
ECHO AO ROOT INDEX: 1.99 CM/M2
ECHO AV AREA PEAK VELOCITY: 2.2 CM2
ECHO AV AREA VTI: 2.2 CM2
ECHO AV AREA/BSA PEAK VELOCITY: 1.4 CM2/M2
ECHO AV AREA/BSA VTI: 1.4 CM2/M2
ECHO AV MEAN GRADIENT: 4 MMHG
ECHO AV MEAN VELOCITY: 1 M/S
ECHO AV PEAK GRADIENT: 8 MMHG
ECHO AV PEAK VELOCITY: 1.4 M/S
ECHO AV VELOCITY RATIO: 0.71
ECHO AV VTI: 29.9 CM
ECHO BSA: 1.6 M2
ECHO LA DIAMETER INDEX: 2.05 CM/M2
ECHO LA DIAMETER: 3.3 CM
ECHO LA TO AORTIC ROOT RATIO: 1.03
ECHO LA VOL A-L A2C: 48 ML (ref 22–52)
ECHO LA VOL A-L A4C: 51 ML (ref 22–52)
ECHO LA VOL BP: 47 ML (ref 22–52)
ECHO LA VOL MOD A2C: 45 ML (ref 22–52)
ECHO LA VOL MOD A4C: 48 ML (ref 22–52)
ECHO LA VOL/BSA BIPLANE: 29 ML/M2 (ref 16–34)
ECHO LA VOLUME AREA LENGTH: 50 ML
ECHO LA VOLUME INDEX A-L A2C: 30 ML/M2 (ref 16–34)
ECHO LA VOLUME INDEX A-L A4C: 32 ML/M2 (ref 16–34)
ECHO LA VOLUME INDEX AREA LENGTH: 31 ML/M2 (ref 16–34)
ECHO LA VOLUME INDEX MOD A2C: 28 ML/M2 (ref 16–34)
ECHO LA VOLUME INDEX MOD A4C: 30 ML/M2 (ref 16–34)
ECHO LV E' LATERAL VELOCITY: 12.69 CM/S
ECHO LV E' SEPTAL VELOCITY: 9.91 CM/S
ECHO LV EDV A2C: 82 ML
ECHO LV EDV A4C: 100 ML
ECHO LV EDV BP: 91 ML (ref 56–104)
ECHO LV EDV INDEX A4C: 62 ML/M2
ECHO LV EDV INDEX BP: 57 ML/M2
ECHO LV EDV NDEX A2C: 51 ML/M2
ECHO LV EJECTION FRACTION A2C: 61 %
ECHO LV EJECTION FRACTION A4C: 64 %
ECHO LV EJECTION FRACTION BIPLANE: 62 % (ref 55–100)
ECHO LV ESV A2C: 32 ML
ECHO LV ESV A4C: 36 ML
ECHO LV ESV BP: 34 ML (ref 19–49)
ECHO LV ESV INDEX A2C: 20 ML/M2
ECHO LV ESV INDEX A4C: 22 ML/M2
ECHO LV ESV INDEX BP: 21 ML/M2
ECHO LV FRACTIONAL SHORTENING: 32 % (ref 28–44)
ECHO LV INTERNAL DIMENSION DIASTOLE INDEX: 2.92 CM/M2
ECHO LV INTERNAL DIMENSION DIASTOLIC: 4.7 CM (ref 3.9–5.3)
ECHO LV INTERNAL DIMENSION SYSTOLIC INDEX: 1.99 CM/M2
ECHO LV INTERNAL DIMENSION SYSTOLIC: 3.2 CM
ECHO LV IVSD: 0.7 CM (ref 0.6–0.9)
ECHO LV MASS 2D: 103.1 G (ref 67–162)
ECHO LV MASS INDEX 2D: 64 G/M2 (ref 43–95)
ECHO LV POSTERIOR WALL DIASTOLIC: 0.7 CM (ref 0.6–0.9)
ECHO LV RELATIVE WALL THICKNESS RATIO: 0.3
ECHO LVOT AREA: 3.1 CM2
ECHO LVOT AV VTI INDEX: 0.74
ECHO LVOT DIAM: 2 CM
ECHO LVOT MEAN GRADIENT: 2 MMHG
ECHO LVOT PEAK GRADIENT: 4 MMHG
ECHO LVOT PEAK VELOCITY: 1 M/S
ECHO LVOT STROKE VOLUME INDEX: 42.9 ML/M2
ECHO LVOT SV: 69.1 ML
ECHO LVOT VTI: 22 CM
ECHO MV A VELOCITY: 0.96 M/S
ECHO MV AREA PHT: 5.6 CM2
ECHO MV E DECELERATION TIME (DT): 134.8 MS
ECHO MV E VELOCITY: 0.77 M/S
ECHO MV E/A RATIO: 0.8
ECHO MV E/E' LATERAL: 6.07
ECHO MV E/E' RATIO (AVERAGED): 6.92
ECHO MV E/E' SEPTAL: 7.77
ECHO MV PRESSURE HALF TIME (PHT): 39.1 MS
ECHO RV FREE WALL PEAK S': 16.9 CM/S
ECHO RV INTERNAL DIMENSION: 3 CM
ECHO RV TAPSE: 2.8 CM (ref 1.7–?)

## 2025-07-16 ENCOUNTER — TRANSCRIBE ORDERS (OUTPATIENT)
Facility: HOSPITAL | Age: 70
End: 2025-07-16

## 2025-07-16 DIAGNOSIS — Z00.00 EXAMINATION: Primary | ICD-10-CM

## 2025-07-27 ENCOUNTER — HOSPITAL ENCOUNTER (OUTPATIENT)
Facility: HOSPITAL | Age: 70
Discharge: HOME | End: 2025-07-30
Attending: FAMILY MEDICINE

## 2025-07-27 DIAGNOSIS — Z00.00 EXAMINATION: ICD-10-CM

## 2025-07-27 PROCEDURE — 75571 CT HRT W/O DYE W/CA TEST: CPT
